# Patient Record
Sex: MALE | Race: WHITE | NOT HISPANIC OR LATINO | Employment: FULL TIME
[De-identification: names, ages, dates, MRNs, and addresses within clinical notes are randomized per-mention and may not be internally consistent; named-entity substitution may affect disease eponyms.]

---

## 2019-11-03 ENCOUNTER — HEALTH MAINTENANCE LETTER (OUTPATIENT)
Age: 29
End: 2019-11-03

## 2020-01-25 ENCOUNTER — APPOINTMENT (OUTPATIENT)
Dept: GENERAL RADIOLOGY | Facility: CLINIC | Age: 30
End: 2020-01-25
Attending: EMERGENCY MEDICINE
Payer: COMMERCIAL

## 2020-01-25 ENCOUNTER — HOSPITAL ENCOUNTER (EMERGENCY)
Facility: CLINIC | Age: 30
Discharge: HOME | End: 2020-01-25
Attending: EMERGENCY MEDICINE | Admitting: EMERGENCY MEDICINE
Payer: COMMERCIAL

## 2020-01-25 VITALS
SYSTOLIC BLOOD PRESSURE: 120 MMHG | HEIGHT: 70 IN | BODY MASS INDEX: 21.47 KG/M2 | DIASTOLIC BLOOD PRESSURE: 82 MMHG | TEMPERATURE: 98.7 F | WEIGHT: 150 LBS | OXYGEN SATURATION: 99 % | HEART RATE: 78 BPM | RESPIRATION RATE: 18 BRPM

## 2020-01-25 DIAGNOSIS — F10.10 ALCOHOL ABUSE: ICD-10-CM

## 2020-01-25 DIAGNOSIS — S22.060A COMPRESSION FRACTURE OF T8 VERTEBRA, INITIAL ENCOUNTER (H): ICD-10-CM

## 2020-01-25 PROCEDURE — 99285 EMERGENCY DEPT VISIT HI MDM: CPT | Mod: 25

## 2020-01-25 PROCEDURE — 96375 TX/PRO/DX INJ NEW DRUG ADDON: CPT

## 2020-01-25 PROCEDURE — 96374 THER/PROPH/DIAG INJ IV PUSH: CPT

## 2020-01-25 PROCEDURE — 25000128 H RX IP 250 OP 636: Performed by: EMERGENCY MEDICINE

## 2020-01-25 PROCEDURE — 72072 X-RAY EXAM THORAC SPINE 3VWS: CPT

## 2020-01-25 PROCEDURE — 25000132 ZZH RX MED GY IP 250 OP 250 PS 637: Performed by: EMERGENCY MEDICINE

## 2020-01-25 RX ORDER — CYCLOBENZAPRINE HCL 10 MG
10 TABLET ORAL 3 TIMES DAILY PRN
Qty: 20 TABLET | Refills: 0 | Status: SHIPPED | OUTPATIENT
Start: 2020-01-25 | End: 2021-04-27

## 2020-01-25 RX ORDER — KETOROLAC TROMETHAMINE 15 MG/ML
15 INJECTION, SOLUTION INTRAMUSCULAR; INTRAVENOUS ONCE
Status: COMPLETED | OUTPATIENT
Start: 2020-01-25 | End: 2020-01-25

## 2020-01-25 RX ORDER — ACETAMINOPHEN 500 MG
1000 TABLET ORAL ONCE
Status: COMPLETED | OUTPATIENT
Start: 2020-01-25 | End: 2020-01-25

## 2020-01-25 RX ORDER — IBUPROFEN 600 MG/1
600 TABLET, FILM COATED ORAL EVERY 6 HOURS PRN
Qty: 30 TABLET | Refills: 1 | Status: SHIPPED | OUTPATIENT
Start: 2020-01-25 | End: 2021-04-27

## 2020-01-25 RX ORDER — OXYCODONE HYDROCHLORIDE 5 MG/1
5 TABLET ORAL ONCE
Status: COMPLETED | OUTPATIENT
Start: 2020-01-25 | End: 2020-01-25

## 2020-01-25 RX ORDER — OXYCODONE HYDROCHLORIDE 5 MG/1
5 TABLET ORAL EVERY 6 HOURS PRN
Qty: 12 TABLET | Refills: 0 | Status: SHIPPED | OUTPATIENT
Start: 2020-01-25 | End: 2021-04-27

## 2020-01-25 RX ORDER — LORAZEPAM 2 MG/ML
1 INJECTION INTRAMUSCULAR ONCE
Status: COMPLETED | OUTPATIENT
Start: 2020-01-25 | End: 2020-01-25

## 2020-01-25 RX ADMIN — ACETAMINOPHEN 1000 MG: 500 TABLET, FILM COATED ORAL at 17:18

## 2020-01-25 RX ADMIN — LORAZEPAM 1 MG: 2 INJECTION INTRAMUSCULAR; INTRAVENOUS at 16:38

## 2020-01-25 RX ADMIN — OXYCODONE HYDROCHLORIDE 5 MG: 5 TABLET ORAL at 18:37

## 2020-01-25 RX ADMIN — KETOROLAC TROMETHAMINE 15 MG: 15 INJECTION, SOLUTION INTRAMUSCULAR; INTRAVENOUS at 16:38

## 2020-01-25 ASSESSMENT — MIFFLIN-ST. JEOR: SCORE: 1641.65

## 2020-01-25 ASSESSMENT — ENCOUNTER SYMPTOMS
NECK PAIN: 0
ABDOMINAL PAIN: 0
BACK PAIN: 1

## 2020-01-25 NOTE — ED NOTES
Bed: ED26  Expected date: 1/25/20  Expected time: 4:11 PM  Means of arrival: Ambulance  Comments:  445 29m fall, back pain ETA 1615

## 2020-01-25 NOTE — ED PROVIDER NOTES
"  History     Chief Complaint:    Back Pain    HPI   Uvaldo Morrow is a 29 year old male with a history of asthma and arrived via EMS who presents with back pain. The patient reports to have been walking downstairs with wet boots on and slipped, falling onto his back and, as reported via EMS, 5 stairs. He states that he waited 15 minutes before calling EMS and was able to stand initially on his own. He notes the pain to be \"maximal\" when moving and okay when still. The patient endorses that he had two beers before the fall. He denies any pain through his neck, hitting his head, chest pain, abdominal pain, urination, or tailbone pain.     Allergies:  No Known Drug Allergies       Medications:    Albuterol    Past Medical History:    Mild intermittent asthma  Gastroesophageal reflux disease     Past Surgical History:    Surgical history reviewed. No pertinent surgical history.    Family History:    Father: Hypertension     Social History:  The patient was accompanied to the ED by EMS with wife and daughter arriving after.  Smoking Status: Never Smoker  Smokeless Tobacco: Never Used  Alcohol Use: Positive. 1-2 drinks a day.  Drug Use: Negative  PCP: Marco Kirk  Marital Status:    Work: Contractor     Review of Systems   Cardiovascular: Negative for chest pain.   Gastrointestinal: Negative for abdominal pain.   Genitourinary: Negative for enuresis.   Musculoskeletal: Positive for back pain. Negative for neck pain.   All other systems reviewed and are negative.        Physical Exam   First Vitals:   Patient Vitals for the past 24 hrs:   BP Temp Temp src Pulse Resp SpO2 Height Weight   01/25/20 1840 120/82 -- -- 78 -- 99 % -- --   01/25/20 1640 (!) 132/96 98.7  F (37.1  C) Oral 92 18 99 % 1.778 m (5' 10\") 68 kg (150 lb)   01/25/20 1631 (!) 132/96 -- -- 93 18 100 % -- --       Physical Exam  Constitutional:  Oriented to person, place, and time. Appears uncomfortable with movement     Appears " well-developed and well-nourished. Smells of alcohol.  HENT:   Head:    Normocephalic and atraumatic.   Right Ear:   Tympanic membrane and external ear normal.   Left Ear:   Tympanic membrane and external ear normal.   Mouth/Throat:   Oropharynx is clear and moist.      Mucous membranes are normal.   Eyes:    Conjunctivae normal and EOM are normal.      Pupils are equal, round, and reactive to light.   Neck:    Normal range of motion. Neck supple.   Cardiovascular:  Normal rate, regular rhythm, S1 normal and S2 normal.      No gallop and no friction rub. No murmur heard.  Pulmonary/Chest:  Breath sounds normal. No respiratory distress.      No wheezes. No rhonchi. No rales.   Abdominal:   Soft. No hepatosplenomegaly. No tenderness.      No rebound and no CVA tenderness.   Musculoskeletal:  Normal range of motion.   Neurological:   Alert and oriented to person, place, and time. Normal strength.      GCS eye subscore is 4. GCS verbal subscore is 5.      GCS motor subscore is 6.   Skin:    Skin is warm and dry.   Psychiatric:   Normal mood and affect.      Speech is normal and behavior is normal.      Judgment and thought content normal.      Cognition and memory are normal.   Constitutional:  Oriented to person, place, and time.      Appears well-developed and well-nourished.   HENT:   Head:    Normocephalic and atraumatic.   Right Ear:   Tympanic membrane and external ear normal.   Left Ear:   Tympanic membrane and external ear normal.   Mouth/Throat:   Oropharynx is clear and moist and mucous membranes are normal.   Eyes:    Conjunctivae normal and EOM are normal.      Pupils are equal, round, and reactive to light.   Neck:    Normal range of motion. Neck supple.   Cardiovascular:  Normal rate, S1 normal, S2 normal and normal heart sounds.      No gallop. No murmur heard.  Pulmonary/Chest:  Effort normal and breath sounds normal.      No wheezes. No rhonchi. No rales.   Abdominal:   Soft. Normal appearance. No  hepatosplenomegaly.      No tenderness. No rigidity, no rebound, no guarding.      No CVA tenderness.   Musculoskeletal:  Normal range of motion. No cervical or midline back tenderness. Bilateral para thoracic spasm, left greater than right.     Neurological:   Alert and oriented to person, place, and time.      Normal strength and normal reflexes.      No cranial nerve deficit or sensory deficit.      GCS eye subscore is 4. GCS verbal subscore is 5.      GCS motor subscore is 6. Finger to nose intact bilaterally.    Emergency Department Course     Imaging:  Radiology findings were communicated with the patient who voiced understanding of the findings.    Thoracic spine XR, 3 views    Mild T8 superior endplate compression fracture.  BROOKS ROSS MD  Reading per radiology    Interventions:  1638 Ativan 1 mg IV  1638 Tordal 15 mg IV  1718 Tylenol 1000 mg PO  Oxycodone 5 mg po    Emergency Department Course:    1625 Nursing notes and vitals reviewed.    1630 I performed an exam of the patient as documented above.     1653 The patient was sent for a thoracic spine XR while in the emergency department, results above.     1707 Patient rechecked and updated.      1738 Patient rechecked and updated.      1838 Patient rechecked and updated.      1926 Findings and plan explained to the Patient. Patient discharged home with instructions regarding supportive care, medications, and reasons to return. The importance of close follow-up was reviewed. The patient was prescribed Flexeril, Advil, and Oxycodone.   His pain is gradually improving. He is ambulatory at time of discharge.       Impression & Plan     Medical Decision Making:  Patient is a 29-year-old male who slipped on 5 steps and complaining of mid back pain.  He denies hurting anything else in particular denies hitting his head.  He denies neck pain.  He has tenderness in his parathoracic area with spasm.  He actually did not have midline tenderness of any  significance.  T-spine x-ray showed T8 mild compression fracture.  He was acutely uncomfortable here and had received 1.5 of Dilaudid on route and received additional pain meds as noted above.  At time of discharge she was able to get up and walk although with discomfort.  He is neurologically intact.  I think he can be discharged home with conservative management to follow-up with orthospine this week.  At time of discharge wife did express concern about  alcohol abuse which the patient had denied to me.  We discussed then that he should limit his Tylenol and use the other medications and not to be drinking when he takes the oxycodone and the Flexeril.  He agreed to chemical dependency referral and this was ordered.  He will follow-up with his primary in the next week and sooner if worse.      Diagnosis:    ICD-10-CM    1. Compression fracture of T8 vertebra, initial encounter (H) S22.060A    2. Alcohol abuse F10.10 MENTAL HEALTH REFERRAL  - Adult; Assessments and Testing; Chemical Health Assessment; [facility]: CD Services [phone]; Patient call to schedule       Disposition:  The patient is discharged to home.    Discharge Medications:  New Prescriptions    CYCLOBENZAPRINE (FLEXERIL) 10 MG TABLET    Take 1 tablet (10 mg) by mouth 3 times daily as needed for muscle spasms    IBUPROFEN (ADVIL/MOTRIN) 600 MG TABLET    Take 1 tablet (600 mg) by mouth every 6 hours as needed for moderate pain    OXYCODONE (ROXICODONE) 5 MG TABLET    Take 1 tablet (5 mg) by mouth every 6 hours as needed for pain       Scribe Disclosure:  I, Valdemar Umanzor, am serving as a scribe at 4:30 PM on 1/25/2020 to document services personally performed by Mary Gil MD based on my observations and the provider's statements to me.         EMERGENCY DEPARTMENT       Mary Gil MD  01/26/20 [id number]

## 2020-01-25 NOTE — ED TRIAGE NOTES
Pt was walking down 5 steps when he slipped and fell, landing on tailbone and then sliding down the rest of the steps. C/o middle back spasms and pain. CMS intact.

## 2020-01-25 NOTE — ED AVS SNAPSHOT
Emergency Department  [address] [facility] [address]  Phone:  [phone]  Fax:  [phone]                                    Uvaldo Morrow   MRN: [id number]    Department:   Emergency Department   Date of Visit:  1/25/2020           After Visit Summary Signature Page    I have received my discharge instructions, and my questions have been answered. I have discussed any challenges I see with this plan with the nurse or doctor.    ..........................................................................................................................................  Patient/Patient Representative Signature      ..........................................................................................................................................  Patient Representative Print Name and Relationship to Patient    ..................................................               ................................................  Date                                   Time    ..........................................................................................................................................  Reviewed by Signature/Title    ...................................................              ..............................................  Date                                               Time          22EPIC Rev 08/18

## 2020-01-26 NOTE — DISCHARGE INSTRUCTIONS
You can use Tylenol 325 mg four times a day. I advise not drinking.   I have placed a chemical dependency referral. You need to call them.

## 2020-02-17 ENCOUNTER — TRANSFERRED RECORDS (OUTPATIENT)
Dept: HEALTH INFORMATION MANAGEMENT | Facility: CLINIC | Age: 30
End: 2020-02-17

## 2020-11-16 ENCOUNTER — HEALTH MAINTENANCE LETTER (OUTPATIENT)
Age: 30
End: 2020-11-16

## 2021-02-07 ENCOUNTER — HOSPITAL ENCOUNTER (EMERGENCY)
Facility: CLINIC | Age: 31
Discharge: HOME | End: 2021-02-07
Attending: PHYSICIAN ASSISTANT | Admitting: PHYSICIAN ASSISTANT
Payer: COMMERCIAL

## 2021-02-07 VITALS
DIASTOLIC BLOOD PRESSURE: 81 MMHG | BODY MASS INDEX: 19.26 KG/M2 | HEIGHT: 69 IN | OXYGEN SATURATION: 99 % | SYSTOLIC BLOOD PRESSURE: 115 MMHG | RESPIRATION RATE: 16 BRPM | WEIGHT: 130 LBS | HEART RATE: 73 BPM

## 2021-02-07 DIAGNOSIS — F48.9 MENTAL HEALTH PROBLEM: ICD-10-CM

## 2021-02-07 LAB
AMPHETAMINES UR QL SCN: POSITIVE
ANION GAP SERPL CALCULATED.3IONS-SCNC: 3 MMOL/L (ref 3–14)
BARBITURATES UR QL: NEGATIVE
BASOPHILS # BLD AUTO: 0.1 10E9/L (ref 0–0.2)
BASOPHILS NFR BLD AUTO: 0.7 %
BENZODIAZ UR QL: NEGATIVE
BUN SERPL-MCNC: 13 MG/DL (ref 7–30)
CALCIUM SERPL-MCNC: 9.1 MG/DL (ref 8.5–10.1)
CANNABINOIDS UR QL SCN: POSITIVE
CHLORIDE SERPL-SCNC: 107 MMOL/L (ref 94–109)
CO2 SERPL-SCNC: 31 MMOL/L (ref 20–32)
COCAINE UR QL: NEGATIVE
CREAT SERPL-MCNC: 0.8 MG/DL (ref 0.66–1.25)
DIFFERENTIAL METHOD BLD: ABNORMAL
EOSINOPHIL # BLD AUTO: 0 10E9/L (ref 0–0.7)
EOSINOPHIL NFR BLD AUTO: 0.6 %
ERYTHROCYTE [DISTWIDTH] IN BLOOD BY AUTOMATED COUNT: 12.7 % (ref 10–15)
ETHANOL SERPL-MCNC: <0.01 G/DL
GFR SERPL CREATININE-BSD FRML MDRD: >90 ML/MIN/{1.73_M2}
GLUCOSE SERPL-MCNC: 77 MG/DL (ref 70–99)
HCT VFR BLD AUTO: 38.5 % (ref 40–53)
HGB BLD-MCNC: 12.1 G/DL (ref 13.3–17.7)
IMM GRANULOCYTES # BLD: 0 10E9/L (ref 0–0.4)
IMM GRANULOCYTES NFR BLD: 0.3 %
LYMPHOCYTES # BLD AUTO: 1.8 10E9/L (ref 0.8–5.3)
LYMPHOCYTES NFR BLD AUTO: 24.5 %
MCH RBC QN AUTO: 26.9 PG (ref 26.5–33)
MCHC RBC AUTO-ENTMCNC: 31.4 G/DL (ref 31.5–36.5)
MCV RBC AUTO: 86 FL (ref 78–100)
MONOCYTES # BLD AUTO: 0.4 10E9/L (ref 0–1.3)
MONOCYTES NFR BLD AUTO: 5.3 %
NEUTROPHILS # BLD AUTO: 4.9 10E9/L (ref 1.6–8.3)
NEUTROPHILS NFR BLD AUTO: 68.6 %
NRBC # BLD AUTO: 0 10*3/UL
NRBC BLD AUTO-RTO: 0 /100
OPIATES UR QL SCN: NEGATIVE
PCP UR QL SCN: NEGATIVE
PLATELET # BLD AUTO: 279 10E9/L (ref 150–450)
POTASSIUM SERPL-SCNC: 3.7 MMOL/L (ref 3.4–5.3)
RBC # BLD AUTO: 4.5 10E12/L (ref 4.4–5.9)
SODIUM SERPL-SCNC: 141 MMOL/L (ref 133–144)
TSH SERPL DL<=0.005 MIU/L-ACNC: 0.78 MU/L (ref 0.4–4)
WBC # BLD AUTO: 7.1 10E9/L (ref 4–11)

## 2021-02-07 PROCEDURE — 84443 ASSAY THYROID STIM HORMONE: CPT | Performed by: PHYSICIAN ASSISTANT

## 2021-02-07 PROCEDURE — 99285 EMERGENCY DEPT VISIT HI MDM: CPT | Mod: 25

## 2021-02-07 PROCEDURE — 90791 PSYCH DIAGNOSTIC EVALUATION: CPT

## 2021-02-07 PROCEDURE — 85025 COMPLETE CBC W/AUTO DIFF WBC: CPT | Performed by: PHYSICIAN ASSISTANT

## 2021-02-07 PROCEDURE — 80307 DRUG TEST PRSMV CHEM ANLYZR: CPT | Performed by: PHYSICIAN ASSISTANT

## 2021-02-07 PROCEDURE — 80048 BASIC METABOLIC PNL TOTAL CA: CPT | Performed by: PHYSICIAN ASSISTANT

## 2021-02-07 PROCEDURE — 82077 ASSAY SPEC XCP UR&BREATH IA: CPT | Performed by: PHYSICIAN ASSISTANT

## 2021-02-07 ASSESSMENT — ENCOUNTER SYMPTOMS
COUGH: 0
ABDOMINAL PAIN: 0
FEVER: 0

## 2021-02-07 ASSESSMENT — MIFFLIN-ST. JEOR: SCORE: 1530.06

## 2021-02-07 NOTE — ED TRIAGE NOTES
Wife is with patient and wife is wanting patient to be evaluated for psych.  Using a lot of CBD oil, smoking CBD oil, and taking Delta 8.  Patient has been having grandious feelings, euphoria, and feels that he is extremely intelligent and a genius  all of a sudden.

## 2021-02-08 NOTE — DISCHARGE INSTRUCTIONS
Fortunately, your medical work-up is unremarkable.  You had an assessment by a mental health provider today and they thought an outpatient plan was reasonable.  I believe this is reasonable as well.  I believe you are safe to discharge home.  Please go to the therapy appointments scheduled for tomorrow at 1 PM and make use of the other resources that were provided.  Please return immediately if you feel your symptoms are worsening or you have any other concerns.  Also consider chemical dependence counseling/treatment.

## 2021-02-08 NOTE — ED PROVIDER NOTES
"  History   Chief Complaint:  Psychiatric Evaluation       HPI   Uvaldo Morrow is a 30 year old male with history of anxiety and depression who presents for a psychiatric evaluation. Per the patient's wife, his personality and behavior have been gradually changing over the past year, with a significant change in the past week. This week, she states that he has suddenly been behaving euphoric, making grandiose statements that he has finally figured out all of his problems, he has a special purpose in life, and that he can help everybody else in the world as well. Today, she reports that she heard him screaming while in the shower, and when she went into the bathroom she found that he was seemingly delusional, yelling that his parents had stolen from him by home schooling him and that he was going to kill them. The patient states that he does not recall this incident, though he has noticed a change in himself over the last several months. He states that he has finally realized why \"[he's] struggled so much [his] whole life\" and he feels as though he has finally figured his whole life out. He notes that he feels happy and excited about the future.  The patient's wife also notes that he has been going through phases of heavy alcohol use and is currently heavily using CBD oil and delta 8 THC, through both ingestion and smoking. Patient states he took delta 8 THC this morning, though otherwise denies co-ingestions or alcohol use recently. He otherwise denies chest pain, abdominal pain, cough, fever, and suicidal or homicidal ideation.    Review of Systems   Constitutional: Negative for fever.   Respiratory: Negative for cough.    Cardiovascular: Negative for chest pain.   Gastrointestinal: Negative for abdominal pain.   Psychiatric/Behavioral: Positive for behavioral problems. Negative for suicidal ideas.        Euphoria (+)   All other systems reviewed and are negative.    Allergies:  The patient has no known " "allergies.     Medications:  Adderall  Citalopram  Buspar    Past Medical History:    Asthma  GERD  PTSD  ADD  Anxiety  Depression      Past Surgical History:    [place] teeth extraction      Family History:    Psychiatric illness  Chromosomal disorder  Cancer     Social History:  Patient presents in the ED with his wife  Lives in a house.     Physical Exam     Patient Vitals for the past 24 hrs:   BP Pulse Resp SpO2 Height Weight   02/07/21 1926 115/81 73 16 99 % 1.753 m (5' 9\") 59 kg (130 lb)       Physical Exam  General: Resting comfortably.  Alert and oriented.   Head:  The scalp, face, and head appear normal   Eyes:  Conjunctivae and sclerae are normal    ENT:    The oropharynx is normal    Uvula is in the midline     Moist mucous membranes   Neck:  No lymphadenopathy  CV:  Regular rate and rhythm     Normal S1/S2  Resp:  Lungs are clear to auscultation    Non-labored    No rales or wheezing   GI:  Abdomen is soft, non-distended    No abdominal tenderness   MS:  Normal muscular tone     Moving all four extremities    Ambulatory   Skin:  No rash or acute skin lesions noted   Neuro: Speech is normal and fluent    Moving all four extremities    Strength and sensation intact in all four extremities    No focal deficits  Psych:  Occasionally good eye contact.  Normal thought pattern.  Intermittent grandiose type thoughts and ideas.  Fluent speech.  Alert and oriented to self, place and time.  Well kempt in appearance.    Emergency Department Course   Laboratory:  CBC: HGB 12.1 (L), WNL (WBC 7.1, )     BMP: AWNL (Creatinine 0.80)    (Collected 1831) Alcohol Ethyl: <0.1    Drug Abuse Screen 77 Urine: Amphetamines positive (A), Cannabinoids positive (A), o/w negative.    TSH with Free T4: 0.78    Emergency Department Course:  Reviewed:  I reviewed the patient's nursing notes, vitals, past medical records, Care Everywhere.     Assessments:  1810 I performed an exam of the patient and obtained history, as " documented above.     2306 I rechecked the patient and discussed the DEC assessment with him and his wife. We discussed plan of care moving forward and I addressed any concerns and questions the two of them had. They are feeling comfortable with discharge.    Consults:   2241 I consulted with the DEC  regarding their evaluation of the patient. At this time, they are recommending outpatient treatment.    Disposition:  The patient was discharged to home.     Impression & Plan   Medical Decision Making:  Uvaldo Morrow is a 30 year old male who presents for a psychiatric evaluation. Please refer the HPI for full details. Essentially, the wife has noted a personality change that she cannot quite describe over the last year or so. However this has become more apparent, especially over the last couple of days. She has noted that he has started to have grandiose ideas about his intelligence and other somewhat paranoid thinking/thought patterns. Today, while in the shower she heard a loud screaming noise and went to the restroom and he stated that he suddenly felt like he understood and stated that his parents are the problem and he made a passive comment about killing his parents. This is what prompted the wife to call the police and they subsequently brought him here for evaluation. On exam, the patient has completely normal exam. He has normal neurologic exam. There has been no head injury recently. I do not feel CT imaging is indicated. Basic labs here are reassuring. Thyroid is normal. Urine drug screen demonstrates cannabinoid and amphetamine use, though otherwise negative.  Alcohol level is undetectable. Patient does demonstrate some behaviors in the room of some grandiose thinking, but overall has relatively normal affect. He has good eye contact and is well kempt in appearance. He has a normal thought process and answers questions appropriately. He does not have pressured speech. However, given the  homicidal threat today, I feel it was DEC evaluation was warranted. DEC spent over an hour with the patient and his wife discussing his symptoms and plan going forward. The DEC  thought it was reasonable for outpatient management. She has a therapy appointment scheduled for tomorrow and several other resources in regards to mental health and chemical dependency resources. I feel this is reasonable. Patient and wife are on board. They understand that if the symptoms worsen or they want him re-evaluated they can come back at any time. I do not feel the patient presents a threat to himself or others. Red flag symptoms, and reasons to return discussed and understood.  They will follow up with therapy and other resources as per DEC's note.  All questions were answered prior to discharge.  The patient/wife understand and agree to this plan.    Diagnosis:    ICD-10-CM    1. Mental health problem  F48.9        Scribe Disclosure:  I, Chiquita Nicole, am serving as a scribe at 6:21 PM on 2/7/2021 to document services personally performed by Sunshine Sanches PA-C based on my observations and the provider's statements to me.     February 7, 2021   [facility] Emergency Department     Sunshine Sanches PA-C  02/08/21 0053

## 2021-04-27 ENCOUNTER — HOSPITAL ENCOUNTER (OUTPATIENT)
Facility: CLINIC | Age: 31
Setting detail: OBSERVATION
Discharge: HOME | End: 2021-04-28
Attending: EMERGENCY MEDICINE | Admitting: PSYCHIATRY & NEUROLOGY
Payer: COMMERCIAL

## 2021-04-27 DIAGNOSIS — F32.A DEPRESSION, UNSPECIFIED DEPRESSION TYPE: ICD-10-CM

## 2021-04-27 DIAGNOSIS — F41.1 GAD (GENERALIZED ANXIETY DISORDER): ICD-10-CM

## 2021-04-27 DIAGNOSIS — F31.81 BIPOLAR 2 DISORDER (H): ICD-10-CM

## 2021-04-27 PROBLEM — Z86.59 HISTORY OF PSYCHIATRIC DISORDER: Status: ACTIVE | Noted: 2021-04-27

## 2021-04-27 PROBLEM — F41.9 ANXIETY: Status: ACTIVE | Noted: 2021-04-27

## 2021-04-27 LAB
ANION GAP SERPL CALCULATED.3IONS-SCNC: 2 MMOL/L (ref 3–14)
BUN SERPL-MCNC: 8 MG/DL (ref 7–30)
CALCIUM SERPL-MCNC: 8.9 MG/DL (ref 8.5–10.1)
CHLORIDE SERPL-SCNC: 104 MMOL/L (ref 94–109)
CO2 SERPL-SCNC: 32 MMOL/L (ref 20–32)
CREAT SERPL-MCNC: 0.85 MG/DL (ref 0.66–1.25)
GFR SERPL CREATININE-BSD FRML MDRD: >90 ML/MIN/{1.73_M2}
GLUCOSE SERPL-MCNC: 79 MG/DL (ref 70–99)
LABORATORY COMMENT REPORT: NORMAL
POTASSIUM SERPL-SCNC: 3.7 MMOL/L (ref 3.4–5.3)
SARS-COV-2 RNA RESP QL NAA+PROBE: NEGATIVE
SODIUM SERPL-SCNC: 138 MMOL/L (ref 133–144)
SPECIMEN SOURCE: NORMAL

## 2021-04-27 PROCEDURE — 250N000013 HC RX MED GY IP 250 OP 250 PS 637: Performed by: PSYCHIATRY & NEUROLOGY

## 2021-04-27 PROCEDURE — 80048 BASIC METABOLIC PNL TOTAL CA: CPT | Performed by: EMERGENCY MEDICINE

## 2021-04-27 PROCEDURE — G0378 HOSPITAL OBSERVATION PER HR: HCPCS

## 2021-04-27 PROCEDURE — 99220 PR INITIAL OBSERVATION CARE,LEVEL III: CPT | Performed by: PSYCHIATRY & NEUROLOGY

## 2021-04-27 PROCEDURE — 80175 DRUG SCREEN QUAN LAMOTRIGINE: CPT | Performed by: EMERGENCY MEDICINE

## 2021-04-27 PROCEDURE — 87635 SARS-COV-2 COVID-19 AMP PRB: CPT | Performed by: EMERGENCY MEDICINE

## 2021-04-27 PROCEDURE — 90791 PSYCH DIAGNOSTIC EVALUATION: CPT

## 2021-04-27 PROCEDURE — C9803 HOPD COVID-19 SPEC COLLECT: HCPCS

## 2021-04-27 PROCEDURE — 99285 EMERGENCY DEPT VISIT HI MDM: CPT | Mod: 25

## 2021-04-27 RX ORDER — ARIPIPRAZOLE 2 MG/1
2 TABLET ORAL ONCE
Status: COMPLETED | OUTPATIENT
Start: 2021-04-27 | End: 2021-04-27

## 2021-04-27 RX ORDER — DEXTROAMPHETAMINE SACCHARATE, AMPHETAMINE ASPARTATE, DEXTROAMPHETAMINE SULFATE AND AMPHETAMINE SULFATE 7.5; 7.5; 7.5; 7.5 MG/1; MG/1; MG/1; MG/1
20 TABLET ORAL 2 TIMES DAILY
COMMUNITY
End: 2024-03-27

## 2021-04-27 RX ORDER — HYDROXYZINE HYDROCHLORIDE 50 MG/1
50 TABLET, FILM COATED ORAL EVERY 4 HOURS PRN
Status: DISCONTINUED | OUTPATIENT
Start: 2021-04-27 | End: 2021-04-28 | Stop reason: HOSPADM

## 2021-04-27 RX ORDER — LAMOTRIGINE 100 MG/1
100 TABLET ORAL DAILY
Status: DISCONTINUED | OUTPATIENT
Start: 2021-04-28 | End: 2021-04-28 | Stop reason: HOSPADM

## 2021-04-27 RX ORDER — LAMOTRIGINE 100 MG/1
100 TABLET ORAL DAILY
COMMUNITY

## 2021-04-27 RX ADMIN — ARIPIPRAZOLE 2 MG: 2 TABLET ORAL at 22:24

## 2021-04-27 RX ADMIN — HYDROXYZINE HYDROCHLORIDE 50 MG: 50 TABLET, FILM COATED ORAL at 23:26

## 2021-04-27 NOTE — ED PROVIDER NOTES
"  History   Chief Complaint:  Mental Health Problem    HPI  History supplemented by electronic chart review and wife at bedside    Uvaldo Morrow is a 30 year old male with history of asthma and prior diagnosis of PTSD, \"bipolar\" and depression who presents with a mental health problem. The patient reports that recently he has been having a difficult time managing his emotions and mood. He notes that he has been having \"high-highs and low-lows\" sporadically over the past few weeks, but worse over the past 3 days. He states that he is having waves of anxiety and self-regulating his emotions. He notes having one episode of anxiety while in the waiting room where he was feeling very anxious, but did not have a specific source of anxiety. The patient does have a psychiatrist, Dr. Sawyer and called the clinic today with his symptoms. He was told to present to the emergency department. The patient denies any changes in his sleep and states he gets 8-9 hours a night. He denies any thoughts of self-harm or homicidal ideation. He also denies any vomiting, diarrhea or COVID-19 exposure. Of note, the patient's wife states he took a synthetic THC a week ago and that he was not supposed to be taking this with his Lamictal.  No hallucinations.  No history of thyroid problems or other serious medical conditions.    Review of Systems   All other systems reviewed and are negative.    Allergies:  The patient has no known allergies.     Medications:  Albuterol  Flexeril  Roxicodone  Adderall  Lamictal     Past Medical History:    Allergic rhinitis  Intermittent asthma  GERD  PTSD  ADD  Depression     Past Surgical History:    [place] teeth extraction    Family History:    Father: hypertension, prostate cancer, psychiatric illness  Sister: psychiatric illness  Brother: psychiatric illness    Social History:  The patient presents with his wife.   The patient works in sales contracts.     Physical Exam     Patient Vitals for the past 24 " "hrs:   BP Temp Temp src Pulse Resp SpO2 Weight   04/27/21 1817 122/75 97.9  F (36.6  C) Temporal 86 18 100 % 59 kg (130 lb)       Physical Exam  General: Nontoxic-appearing male sitting upright in room 15, wife at bedside  HENT: mucous membranes moist, thyroid nonpalpable  Eyes: PERRL without nystagmus  CV: extremities well perfused, regular rhythm  Resp: normal effort, speaks in full phrases, no stridor, no cough observed  GI: abdomen soft and nontender, no guarding  MSK: no bony tenderness   Skin: appropriately warm and dry  Neuro: alert, clear speech, oriented, normal tone in extremities, ambulatory  Psych: reports feeling \"anxious\", cooperative, denies feeling suicidal, no evidence of hallucinations, no apparent silvia, able to articulate recent symptoms fairly well      Emergency Department Course     Laboratory:  BMP: Anion Gap 2 (L) o/w WNL (Creatinine 0.85)   Lamotrigine Level: Pending    Asymptomatic COVID19 Virus PCR by nasopharyngeal swab: Negative     Emergency Department Course:    Reviewed:  I reviewed nursing notes, vitals, past medical history and care everywhere    Assessments:  1832 I obtained history and examined the patient as noted above.   2015 I rechecked the patient and explained findings.     Disposition:  The patient was transferred to [facility].       Impression & Plan   Medical Decision Making:  Medical precipitants of his symptoms were considered but thought to be extremely unlikely in light of his prior history of similar symptoms.  Electrolytes are reassuring.  Lamictal level was sent though this is a send out and will not result today, but I think it is most [facility].  He is voluntary and does not meet criteria to be held against his will.  His wife was involved and agrees with this plan as well.    Covid-19  Uvaldo Morrow was evaluated during a global COVID-19 pandemic, which necessitated " consideration that the patient might be at risk for infection with the SARS-CoV-2 virus that causes COVID-19.   Applicable protocols for evaluation were followed during the patient's care.   COVID-19 was considered as part of the patient's evaluation. The plan for testing is:  a test was obtained during this visit.    Diagnosis:    ICD-10-CM    1. Anxiety  F41.9    2. Depression, unspecified depression type  F32.9    3. History of psychiatric disorder  Z86.59          Scribe Disclosure:  I, Elijah Whyte, am serving as a scribe at 6:21 PM on 4/27/2021 to document services personally performed by Jayme Dumont MD based on my observations and the provider's statements to me.     This note was completed in part using Dragon voice recognition software. Although reviewed after completion, some word and grammatical errors may occur.          Jayme Dumont MD  04/27/21 2148

## 2021-04-27 NOTE — ED TRIAGE NOTES
"Pt reports he has had worsening bipolar voer the last 3 days describes as \"high highs and low lows\".    "

## 2021-04-28 VITALS
TEMPERATURE: 97.7 F | HEIGHT: 69 IN | HEART RATE: 97 BPM | OXYGEN SATURATION: 99 % | SYSTOLIC BLOOD PRESSURE: 120 MMHG | WEIGHT: 130 LBS | BODY MASS INDEX: 19.26 KG/M2 | RESPIRATION RATE: 16 BRPM | DIASTOLIC BLOOD PRESSURE: 82 MMHG

## 2021-04-28 PROCEDURE — 250N000013 HC RX MED GY IP 250 OP 250 PS 637: Performed by: PSYCHIATRY & NEUROLOGY

## 2021-04-28 PROCEDURE — G0378 HOSPITAL OBSERVATION PER HR: HCPCS

## 2021-04-28 PROCEDURE — 99217 PR OBSERVATION CARE DISCHARGE: CPT | Mod: GC | Performed by: PSYCHIATRY & NEUROLOGY

## 2021-04-28 RX ORDER — ARIPIPRAZOLE 5 MG/1
5 TABLET ORAL DAILY
Qty: 30 TABLET | Refills: 0 | Status: SHIPPED | OUTPATIENT
Start: 2021-04-28 | End: 2024-03-27

## 2021-04-28 RX ADMIN — LAMOTRIGINE 100 MG: 100 TABLET ORAL at 07:36

## 2021-04-28 ASSESSMENT — ACTIVITIES OF DAILY LIVING (ADL)
DRESS: STREET CLOTHES
ORAL_HYGIENE: INDEPENDENT
HYGIENE/GROOMING: INDEPENDENT

## 2021-04-28 NOTE — ED PROVIDER NOTES
"ED Observation Psychiatric Discharge Summary   [facility] Emergency Department - [facility] Unit  Discharge Date: 4/28/2021    Uvaldo Morrow MRN: [id number]   Age: 30 year old YOB: 1990     Brief HPI & Initial ED Course     Chief Complaint   Patient presents with     Mental Health Problem     Pt reports he has had worsening bipolar voer the last 3 days describes as \"high highs and low lows\".       HPI  Uvaldo Morrow is a 30 year old male with a past history notable for asthma, PTSD and bipolar 2 admitted to the [facility] Observation Unit with worsening up and down moods and anxiety.    The patient was evaluated in the emergency department by a physician and licensed mental health . The patient's psychiatric state was such that he would benefit from ongoing monitoring. Observation care was initiated with the plan including serial assessments of psychiatric condition, potential administration of medications if indicated, and further disposition pending the patient's psychiatric course during the monitoring period.     See ED Observation H&P for further details on the patient's presenting history and initial evaluation.     On examination today, the patient reports that his mood has improved and he is tolerating the addition of Abilify well.  He was able to tolerate the discussion of stressors which precipitated his emergency room visit.  He expressed feeling hopeful and was looking forward to his weekly therapy sessions.  He will continue medication management appointments however will inquire for sooner appointments as he was previously being seen every 3 months.  He reflected on his diagnosis and reported acceptance after appreciating the mood instability he has been experiencing.  He asked appropriate questions regarding his medication as we discussed potential side effects to monitor for over the upcoming weeks and months.    He denied suicidal and homicidal thoughts.  No psychotic symptoms " "reported.  No medication side effects reported.    Physical Examination   BP: 120/82  Pulse: 97  Temp: 97.7  F (36.5  C)  Resp: 16  Height: 175.3 cm (5' 9\")  Weight: 59 kg (130 lb)  SpO2: 99 %    Physical Exam  General: Appears stated age.   Neuro: Alert and fully oriented. Extremities appear to demonstrate normal strength on visual inspection.   Integumentary/Skin: no rash visualized, normal color    Psychiatric Examination   Appearance: awake, alert  Attitude:  cooperative  Eye Contact:  good  Mood:  better  Affect:  mood congruent  Speech:  clear, coherent  Psychomotor Behavior:  no evidence of tardive dyskinesia, dystonia, or tics  Throught Process:  logical  Associations:  no loose associations  Thought Content:  no evidence of suicidal ideation or homicidal ideation and no evidence of psychotic thought  Insight:  fair  Judgement:  intact  Oriented to:  time, person, and place  Attention Span and Concentration:  intact  Recent and Remote Memory:  fair    Results        Labs Ordered and Resulted from Time of ED Arrival Up to the Time of Departure from the ED   BASIC METABOLIC PANEL - Abnormal; Notable for the following components:       Result Value    Anion Gap 2 (*)     All other components within normal limits   SARS-COV-2 (COVID-19) VIRUS RT-PCR   LAMOTRIGINE LEVEL   PATIENT CARE ORDER       Observation Course   The patient was found to have a psychiatric condition that would benefit from an observation stay in the emergency department for further psychiatric stabilization and/or coordination of a safe disposition. The plan upon observation admission included serial assessments of psychiatric condition, potential administration of medications if indicated, further disposition pending the patient's psychiatric course during the monitoring period.     Serial assessments of the patient's psychiatric condition were performed. Nursing notes were reviewed. During the observation period, the patient did not require " medications for agitation, and did not require restraints/seclusion for patient and/or provider safety.     After a period of working with the treatment team on the [facility] unit, the patient's mental state improved to allow a safe transition to outpatient care. After counseling on the diagnosis, work-up, and treatment plan, the patient was discharged. Close follow-up with a psychiatrist and/or therapist was recommended and community psychiatric resources were provided. Patient is to return to the ED if any urgent or potentially life-threatening concerns.     Medication adjustment/changes that were addressed during this visit included:  -Addition of Abilify 2 mg which was increased to 5 mg daily at the time of discharge targeting mood stabilization as lamotrigine is gradually being titrated up while awaiting a therapeutic response to that medication.  Once he is reached and anticipated therapeutic dose of 200 mg with lamotrigine, he may discuss whether there is benefit of continuing treatment with Abilify as an augmenting agent or if it would be more beneficial to utilize as monotherapy.  Risks and benefits of Abilify were reviewed along with the importance of monitoring appetite and weight gain over the next weeks to months.    He will continue weekly individual therapy appointments.  He will contact his medication prescriber to request more frequent visits until his medication plan is solidified and he is noted to be having a therapeutic response.     At the time of discharge, the patient's acute suicide risk was determined to be low due to the following factors: Reduction in the intensity of mood/anxiety symptoms that preceded the admission, denial of suicidal thoughts, denies feeling helpless or helpless, not currently under the influence of alcohol or illicit substances, denies experiencing command hallucinations, no immediate access to firearms. The patient's acute risk could be higher if noncompliant with their  treatment plan, medications, follow-up appointments or using illicit substances or alcohol. Protective factors include: social supports, children, stable housing, employment.    Discharge Diagnoses:   Final diagnoses:   Depression, unspecified depression type   Bipolar 2 disorder (H) - rule out   SARA (generalized anxiety disorder)     Disposition: Discharged home  --  Elliott Delgadillo MD  [facility] EMERGENCY DEPT  [facility] Unit  4/28/2021        Elliott Delgadillo MD  04/28/21 0986

## 2021-04-28 NOTE — PLAN OF CARE
Problem: Mood Impairment (Anxiety Signs/Symptoms)  Goal: Improved Mood Symptoms (Anxiety Signs/Symptoms)  Outcome: Improving     Pt felt that he was safe to discharge and had no follow up questions after providing education related to new medication that was prescribed-abilify

## 2021-04-28 NOTE — ED NOTES
Awake, watching TV.  Reports feeling more calm now that he is away from his home environment.  Hydroxyzine given to patient as he is worried about being able to sleep tonight.

## 2021-04-28 NOTE — ED NOTES
Affect was blunted, pt reports that his mood is calm. Patient agrees to discharge plan. Discharge instructions reviewed with patient including follow-up care plan. Medication abilify was ordered and sent to pt's community pharmacy. Pt denied any questions regarding this new medication. Reviewed safety plan and outpatient resources. Denies SI and HI. All belongings that were brought into the hospital have been returned to patient. Escorted off the unit at 1019 accompanied by [facility] staff Discharged to home via uber ride.

## 2021-04-28 NOTE — ED PROVIDER NOTES
"ED Observation Psychiatric [facility] Emergency Department - [facility] Unit  Observation Initiation Date: Apr 27, 2021    Uvaldo Morrow MRN: [id number]   Age: 30 year old YOB: 1990     History     Chief Complaint   Patient presents with     Mental Health Problem     Pt reports he has had worsening bipolar voer the last 3 days describes as \"high highs and low lows\".       HPI  Uvaldo Morrow is a 30 year old male with a past history notable for asthma, PTSD and bipolar 2 [facility] with worsening up and down moods and anxiety.  He feels his thoughts are racing with his anger and irritability becoming more labile than normal. He has had times where he feels very sad. He has no suicidal thoughts although he is scared that they may come due to the lack of control of his mood. He is under much stress including work stress and marital issues. He feels he cannot do anything good and has some helplessness. He just got up to 100 mg of lamictal a week ago. He denies any alcohol or drug use.     Past Medical History  Past Medical History:   Diagnosis Date     Allergic rhinitis, cause unspecified      Mild intermittent asthma     exercise or allergy related.     Past Surgical History:   Procedure Laterality Date     NO HISTORY OF SURGERY       amphetamine-dextroamphetamine (ADDERALL) 30 MG tablet  lamoTRIgine (LAMICTAL) 100 MG tablet  albuterol (PROAIR HFA, PROVENTIL HFA, VENTOLIN HFA) 108 (90 BASE) MCG/ACT inhaler      Allergies   Allergen Reactions     No Known Drug Allergies      Family History  Family History   Problem Relation Age of Onset     Diabetes Paternal Grandfather      Cerebrovascular Disease Paternal Grandfather      Heart Disease Paternal Grandfather      Prostate Cancer Paternal Grandfather      Cancer Maternal Grandfather         throat     Hypertension Father      Cancer - colorectal No family hx of      Social History   Social History     Tobacco Use     " "Smoking status: Never Smoker     Smokeless tobacco: Never Used   Substance Use Topics     Alcohol use: Yes     Comment: socially     Drug use: Yes     Comment: Past=julien      Past medical history, past surgical history, medications, allergies, family history, and social history were reviewed with the patient. No additional pertinent items.       Review of Systems  A complete review of systems was performed with pertinent positives and negatives noted in the HPI, and all other systems negative.    Physical Examination   BP: 122/75  Pulse: 86  Temp: 97.9  F (36.6  C)  Resp: 18  Height: 175.3 cm (5' 9\")  Weight: 59 kg (130 lb)  SpO2: 100 %    Physical Exam  General: Appears stated age.   Neuro: Alert and fully oriented. Extremities appear to demonstrate normal strength on visual inspection.   Integumentary/Skin: no rash visualized, normal color    Psychiatric Examination   Appearance: awake, alert  Attitude:  cooperative  Eye Contact:  good  Mood:  anxious and sad   Affect:  appropriate and in normal range  Speech:  clear, coherent  Psychomotor Behavior:  no evidence of tardive dyskinesia, dystonia, or tics  Throught Process:  logical, linear and goal oriented  Associations:  no loose associations  Thought Content:  no evidence of suicidal ideation or homicidal ideation and no evidence of psychotic thought  Insight:  good  Judgement:  intact  Oriented to:  time, person, and place  Attention Span and Concentration:  intact  Recent and Remote Memory:  intact    ED Course        Labs Ordered and Resulted from Time of ED Arrival Up to the Time of Departure from the ED   BASIC METABOLIC PANEL - Abnormal; Notable for the following components:       Result Value    Anion Gap 2 (*)     All other components within normal limits   SARS-COV-2 (COVID-19) VIRUS RT-PCR   LAMOTRIGINE LEVEL   PATIENT CARE ORDER       Assessments & Plan (with Medical Decision Making)   Patient presenting with . Nursing notes reviewed noting no " acute issues.     I have reviewed the assessment completed by the [facility].     He was given a dose of 2 mg of abilify to start to help with mood stabilization.  He will also be given 50 mg of hydroxyzine 50 mg for sleep with the ability to repeat it if needed.  He has therapy and psychiatry.  Most likely he will need to increase his lamictal and continue the abilify, but this plan can be worked on tomorrow when discharged.      The patient was found to have a psychiatric condition that would benefit from an observation stay in the emergency department for further psychiatric stabilization and/or coordination of a safe disposition. The observation plan includes serial assessments of psychiatric condition, potential administration of medications if indicated, further disposition pending the patient's psychiatric course during the monitoring period.     Preliminary diagnosis:    ICD-10-CM    1. Anxiety  F41.9    2. Depression, unspecified depression type  F32.9    3. Bipolar 2 disorder (H)  F31.81     rule out        --  Seun Baugh MD   [facility] EMERGENCY DEPT  [facility] Unit  4/27/2021          Seun Baugh MD  04/27/21 1460

## 2021-04-28 NOTE — CONSULTS
4/27/2021  Uvaldo ESCAMILLA Cristhian 1990     [facility] Mental Health Assessment:    Started at: 2110  Completed at: 2251  What type of assessment are you doing today? Full DA    1.  Presenting Problem:      Referral Method to ED? Self and Family/Friends     What brings the patient to the ED today? Patient came into the ED accompanied by his wife.  He reports having difficulty controlling and managing his emotions and mood over the past few weeks.  Patient describes his feelings as an emotional whiplash with mood cycles becoming more frequent and intense.  Wife shares that that patient denies having a Bipolar diagnosis or mental health issues however patient was showing some acceptance during my interview of mental health concerns.  Patient denies having suicidal thoughts currently yet was receptive education about passive suicidal thoughts with normalization of that occurrence.      Has this happened before? Yes previous psych hospitalization 2/2021    Duration of presenting problem: several weeks but increasing intensity and frequency of mood shifts starting 4/24/2021    Additional Stressors: marriage conflicts, work stresses (new manager), strives for perfection    2.  Risk Assessment:  Suicide and Self-Harm    ESS-6  1.a. Over the past 2 weeks, have you had thoughts of killing yourself? No   1.b. Have you ever attempted to kill yourself and, if yes, when did this last happen? No  2. Recent or current suicide plan? No  3. Recent or current intent to act on ideation? No  4. Lifetime psychiatric hospitalization? No  5. Pattern of excessive substance use? No  6. Current irritability, agitation, or aggression? Yes  ESS-6 Score: 1    SI: Passive  Plan: No  Intent: No   Prior Attempts: No     Protective Factors: daughter, family, work, wants to know why these moods shifts are happening    Hopes and goals for the future: be a good father, improve relationship with wife, understand mental health better    Coping Skills: What helps  and doesn't help? Talk with providers, play with daughter, take medications     Additional Risk Factors Related to Safety and Suicide: No    Is the patient engaged in self injurious behaviors? No     Risk to Others    Aggressive/Assaultive/Homicidal Risk Factors: No     Duty to Warn? No     Was a Child Protection Report Made? No       Was a Adult Protection Report Made? No        Sexually inappropriate behavior? No        Vulnerability to sexual exploitation? No     Additional information: n/a      3. Mental Health Symptoms and Substance Use  Current Symptoms and Mental Health History    GAIN Short Screener (GAIN-SS) administered? NA    Attention, Hyperactivity, and Impulsivity Symptoms      Patient reported symptoms related to hyperactivity, inattention, or impulsivity? Yes: Restless  Patient seemed restless in terms of trying to figure out what his going on in his mind, trying to find balance in life, identify boundaries, learn how to cope with not having all the answers right in the moment.      Anxiety Symptoms    Patient reported anxiety symptoms? Yes: Generalized Symptoms: Cognitive anxiety - feelings of doom, racing thoughts, difficulty concentrating  and Excessive worry     Patient endorses anxiety related to not knowing the cause of his current mental health situation, worries that he has alienated his wife and family, anxious about not having control of his mood swings.  He shared that he will have frequent life epiphanies where he will have significant paradigm shifts which are complex.  Patient fears that he has lost everything in life during this time of mood swings and instability.      Behavioral Difficulties    Patient reported behavioral difficulties? No   Patient does not appear to have any behavioral difficulties listed her.     Mood Symptoms    Patient reported mood disorder symptoms? Yes: Crying or feels like crying, Feelings of hopelessness , Impaired decision making  and Sad, depressed mood     Patient was tearful throughout the assessment, especially when talking about his daughter and wanting to have answers for most situations.  He stated he feels hopeless most of the time due to causing harm and difficulties to his family, especially his wife and their relationship.  Patient appeared sad and depressed throughout the assessment.     Eating Disorders and Appetite Disturbance      Patient reported appetite symptoms? No       SCOFF  Do you make yourself sick (induce vomiting) because you feel uncomfortably full? n/a   Do you worry that you have lost Control over how much you eat? n/a  Have you recently lost more than 14 lb in a three-month period? n/a   Do you think you are too fat, even though others say you are too thin? n/a   Would you say that food dominates your life? n/a  SCOFF Score: n/a    Patient reported appetite or eating disorder symptoms? No      Interpersonal Functioning     Patient reported difficulties that may be associated with personality and interpersonal functioning? Yes: Impaired Interpersonal Functioning  Patient endorses that he and his wife's relationship is very strained and he is surprised she has not left him.  However, I pointed out that they have seemed to work through previous difficulties as she is here with him tonight, supporting as she can and wanting him to get help.      Learning Disabilities/Cognitive/Developmental Disorders    Patient reported concerns related to learning disabilities, cognitive challenges, and/or developmental disorders? No       General Cognitive Impairments    Patient reported symptoms of cognitive impairments? No  If yes, complete Mini-Cog Assessment.    Sleep Disturbance    Patient reported difficulties with sleep? No   Patient reports getting good, quality, restful sleep at night.  He tends to sleep from 10/11pm to 7am.      Psychosis Symptoms    Patient reported symptoms of psychosis? No    Patient denies having any AH, VH, or delusional  thoughts.     Trauma and Post-Traumatic Stress Disorder    Physical Abuse: No   Emotional/Psychological Abuse: No  Sexual Abuse: No  Loss of a friend or family member to suicide: No  Other Traumatic Event: No     Patient reported trauma related symptoms? No   Patient denied experiencing any trauma listed here.     Impact of Mental Health on Functioning      Negative Impact Score: 8/10   Subjective Impact on functioning: Patient shares that this situation/condition has impacted his life a lot recently and he does not know why he is experiencing the rapid cycling of mood and thoughts.  He wants to have answers as to why he is experiencing this.   How do symptoms vary from baseline? Patient reports he used to be able to manage/control the mood shifts better as they happened less frequently or were intense.     Current and Historical Substance Use Note:    IIs there a history of, or current, substance use? Yes: history of alcohol use, sober for one year; history of THC use, sober for three months.     Have you been to chemical dependency treatment or detox before? No     CAGE-AID    Have you felt you ought to cut down on your drinking or drug use? No     Have people annoyed you by criticizing your drinking or drug use? No   Have you felt bad or guilty about your drinking or drug use? No  Have you ever had a drink or used drugs first thing in the morning to steady your nerves or to get rid of a hangover? No   CAGE-AID Score: 0/4    Drug screen completed? No   BAL/Breathalyzer completed? No       Mental Status Exam:    Affect: Labile  Appearance: Appropriate   Attention Span/Concentration: Attentive    Eye Contact: Variable  Fund of Knowledge: Appropriate   Language /Speech Content: Expressive Speech  Language /Speech Volume: Soft   Language /Speech Rate/Productions: Slow   Recent Memory: Intact  Remote Memory: Intact  Mood: Depressed and Sad   Orientation:   Person: Yes   Place: Yes  Time of Day: Yes   Date: Yes    Situation (Do they understand why they are here?): Yes   Psychomotor Behavior: Normal   Thought Content: Clear  Thought Form: Goal Directed and Intact    4. Social and Environmental Conditions   Is the patient their own guardian? 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 No    Spiritual and Cultural Influences  Do you have any Mormonism beliefs that are important in your life? No     Do you have any cultural influences in your life that impact your mental health care? No        5. Psychiatric History, Medical History, and Current Care      Patient Mental Health Services   Does the patient have a history of mental health concerns/diagnoses? Yes diagnosed with Bipolar from community psychiatry     Current Providers  Primary Care Provider: Yes [facility]. Marco Kirk, [facility]   [facility]   Therapist: Yes Christo Parikh [facility], [address] [place] [address] [address], [place], [place]; [phone]   : No   ARMHS: No   ACT Team: No   Other: No    History of Commitment? No  History of Psychiatric Hospitalizations? No one prior ED visit for similar concern here at [facility] in Feb. 2021  History of programmatic care? No    Family Mental Health History   Family History of Mental Health or Chemical Dependency Issues? No     Development and Physical Health Challenges  Delays or concerns meeting developmental milestones? No  Current psychotropic medications? Yes Lamictal   Medication Compliant? Yes   Recent medication changes? Yes  Community psychiatrist increased Lamictal dose to 100mg  on 4/21/2021  History of concussion or TBI? No     Additional Information: patient is concerned about current medication change causing these frequent mood swings.     6. Collateral Information and Collaboration    Collaboration with medical staff:Referral Information:   Medical Records, Psychiatry and Nursing     Collateral Information/Sources: Medical Records: [facility] records and Family: wife ([phone])    7. Assessment and Diagnosis  Assessment of patient strengths and vulnerabilities    Strengths, Protective Factors, & Community Resources: engaged with providers, wants answers, relationship with his daughter, wants to do good by others    Patient skills, abilities, and coping skills (what is going well?): good father, willing to be vulnerable with providers, serves others    Patient vulnerabilities: strives for perfection, does not always respect or acknowledge boundaries, fears he lost everything    Diagnosis  F32.9 Major depresive disorder, single episode, unspecified   R/O F31.31 Bipolar disorder, current episode depressed, mild      8.Therapeutic Methodologies Utilized in Assessment    Psychotherapy techniques and/or interventions used: Establishing rapport, Active listening, Apply solution-focused therapy to address current crisis, Identify additional supports and alternative coping skills and Brief Supportive Therapy    9. Patient Care/Treatment Plan  Summary of Patient Presentation and needs  What are the basic needs for this patient in this moment? Observation overnight, medication management, psychoeducation about Bipolar and medications.       Consultations :  Attending provider consulted? Yes  Attending Name: Amauri   Attending concurs with disposition? Yes     Recommended disposition: Individual Therapy, Medication Management and Other: observation on Em[facility] overnight     Does the patient agree with the recommended level of care? Yes    Final disposition: Other: reassess patient in the morning after  getting an increase of Lamictal; continue with individual therapy and community psyciatry    Disposition Details: patient will be reassessed in the morning by doc and LMHP to see how he feels with an increase dose of Lamictal and a night of rest. He will continue with community provider for therapy and psychiatry.     If Inpatient, is patient admitted voluntary? Yes   Patient aware of potential for transfer if there is not appropriate placement? NA  Patient is willing to travel outside of the [facility] for placement? NA   Central Intake Notified? No    10. Patient Care Document: Safety and After Care Planning:          Safety Plan Provided? No    Follow-Up Plans and Providers: reassess in the morning with doctor and LMHP    Follow-Up Plan:  After care plan provided to the patient/guardian by: no  After care plan provided to any additional sources/parties? No    Duration of face to face time with patient in minutes: .50 hrs    CPT code(s) utilized: 40353 - Psychotherapy for Crisis - 60 (30-74*) min      ALIN Carrasquillo

## 2021-04-28 NOTE — DISCHARGE INSTRUCTIONS
Make a follow up appointment with Dr. Sawyer for psychiatry follow up  Continue attending weekly therapy       If I am feeling unsafe or I am in a crisis, I will:   Contact my established care providers   Call the National Suicide Prevention Lifeline: [phone]   Go to the nearest emergency room   Call [phone]        Warning signs that I or other people might notice when a crisis is developing for me: wanting to talk about things immediately, mood swings    Things I am able to do on my own to cope or help me feel better: playing video games     Things that I am able to do with others to cope or help me better: spending time with daughter     Things I can use or do for distraction: video games, go for a walk, watch TV     People in my life that I can ask for help: parents, wife at times     Your county has a mental health crisis team you can call 24/7: [facility] Crisis Line Number: [phone]    Other things that are important when I m in crisis: continue taking medication as prescribed, attending therapy, follow up with psychiatry

## 2021-04-28 NOTE — PLAN OF CARE
"30 year old male received from ER due to increased anxiety and labile mood. Reports feeling \"blakely with high high's and low low's recently.\" Denies SI/HI. Previous MH history includes depression and PTSD. Patient presents with flat affect, anxious in mood. Patient search completed with two staff members present. Nursing assessment complete including patient and medication profiles. Risk assessments completed addressing suicide and safety issues. Care plan initiated. Video monitoring in progress.     Estefanía Caban RN      "

## 2021-04-28 NOTE — PROGRESS NOTES
"[facility] Reassessment Note:    Writer met with patient this morning for check in and reassessment. He states he did not sleep well last night due to being in a recliner, \"I just couldn't get comfortable\". Patient spends time processing events that led to his emPATH visit, reporting that he has been having \"marriage and home issues\" with his wife. He endorses feeling as though he has been having \"dramatic mood swings\" over the past week, however yesterday he was shifting between moods \"every hour\". He called his psychiatrist's office and was recommended to be seen in the ED. Patient was recently switched to Lamictal with his provider however feels this has been making his symptoms worse. Patient feels ready to discharge after meeting with psychiatry. Safety plan completed.     He has an appointment with his therapist next Monday at noon and sees her weekly.  He will schedule an appointment with his psychiatrist, Dr. Sawyer within the next few weeks.     Pricilla Castellanos on 4/28/2021 at 8:00 AM    "

## 2021-04-28 NOTE — PHARMACY-ADMISSION MEDICATION HISTORY
Pharmacy Medication History  Admission medication history interview status for the 4/27/2021  admission is complete. See EPIC admission navigator for prior to admission medications     Location of Interview: Patient room  Medication history sources: Patient      Medication reconciliation completed by provider prior to medication history? No    Time spent in this activity: 15 minutes     Prior to Admission medications    Medication Sig Last Dose Taking? Auth Provider   amphetamine-dextroamphetamine (ADDERALL) 30 MG tablet Take 30 mg by mouth 2 times daily 4/27/2021 at Unknown time Yes Reported, Patient   lamoTRIgine (LAMICTAL) 100 MG tablet Take 100 mg by mouth daily 4/27/2021 at Unknown time Yes Reported, Patient   albuterol (PROAIR HFA, PROVENTIL HFA, VENTOLIN HFA) 108 (90 BASE) MCG/ACT inhaler Inhale 2 puffs into the lungs every 6 hours as needed for shortness of breath / dyspnea. Unknown at Unknown time  Priyank Grimaldo MD       The information provided in this note is only as accurate as the sources available at the time of update(s) '

## 2021-04-30 LAB — LAMOTRIGINE SERPL-MCNC: 4.4 UG/ML (ref 2.5–15)

## 2021-09-18 ENCOUNTER — HEALTH MAINTENANCE LETTER (OUTPATIENT)
Age: 31
End: 2021-09-18

## 2022-01-08 ENCOUNTER — HEALTH MAINTENANCE LETTER (OUTPATIENT)
Age: 32
End: 2022-01-08

## 2022-11-19 ENCOUNTER — HEALTH MAINTENANCE LETTER (OUTPATIENT)
Age: 32
End: 2022-11-19

## 2023-04-15 ENCOUNTER — HEALTH MAINTENANCE LETTER (OUTPATIENT)
Age: 33
End: 2023-04-15

## 2024-03-27 ENCOUNTER — OFFICE VISIT (OUTPATIENT)
Dept: FAMILY MEDICINE | Facility: CLINIC | Age: 34
End: 2024-03-27
Payer: COMMERCIAL

## 2024-03-27 VITALS
WEIGHT: 143.8 LBS | OXYGEN SATURATION: 98 % | TEMPERATURE: 98.1 F | HEART RATE: 108 BPM | SYSTOLIC BLOOD PRESSURE: 128 MMHG | HEIGHT: 69 IN | DIASTOLIC BLOOD PRESSURE: 88 MMHG | RESPIRATION RATE: 18 BRPM | BODY MASS INDEX: 21.3 KG/M2

## 2024-03-27 DIAGNOSIS — F66 GENDER IDENTITY UNCERTAINTY IN ADULT: Primary | ICD-10-CM

## 2024-03-27 PROCEDURE — 99204 OFFICE O/P NEW MOD 45 MIN: CPT | Performed by: NURSE PRACTITIONER

## 2024-03-27 RX ORDER — DEXTROAMPHETAMINE SACCHARATE, AMPHETAMINE ASPARTATE, DEXTROAMPHETAMINE SULFATE AND AMPHETAMINE SULFATE 5; 5; 5; 5 MG/1; MG/1; MG/1; MG/1
20 TABLET ORAL
COMMUNITY
Start: 2024-03-05

## 2024-03-27 RX ORDER — DEXTROAMPHETAMINE SACCHARATE, AMPHETAMINE ASPARTATE MONOHYDRATE, DEXTROAMPHETAMINE SULFATE AND AMPHETAMINE SULFATE 5; 5; 5; 5 MG/1; MG/1; MG/1; MG/1
20 CAPSULE, EXTENDED RELEASE ORAL
COMMUNITY
Start: 2024-03-11

## 2024-03-27 ASSESSMENT — ASTHMA QUESTIONNAIRES
ACT_TOTALSCORE: 25
QUESTION_5 LAST FOUR WEEKS HOW WOULD YOU RATE YOUR ASTHMA CONTROL: COMPLETELY CONTROLLED
QUESTION_4 LAST FOUR WEEKS HOW OFTEN HAVE YOU USED YOUR RESCUE INHALER OR NEBULIZER MEDICATION (SUCH AS ALBUTEROL): NOT AT ALL
QUESTION_1 LAST FOUR WEEKS HOW MUCH OF THE TIME DID YOUR ASTHMA KEEP YOU FROM GETTING AS MUCH DONE AT WORK, SCHOOL OR AT HOME: NONE OF THE TIME
ACT_TOTALSCORE: 25
QUESTION_2 LAST FOUR WEEKS HOW OFTEN HAVE YOU HAD SHORTNESS OF BREATH: NOT AT ALL
QUESTION_3 LAST FOUR WEEKS HOW OFTEN DID YOUR ASTHMA SYMPTOMS (WHEEZING, COUGHING, SHORTNESS OF BREATH, CHEST TIGHTNESS OR PAIN) WAKE YOU UP AT NIGHT OR EARLIER THAN USUAL IN THE MORNING: NOT AT ALL

## 2024-03-27 ASSESSMENT — PATIENT HEALTH QUESTIONNAIRE - PHQ9
SUM OF ALL RESPONSES TO PHQ QUESTIONS 1-9: 7
SUM OF ALL RESPONSES TO PHQ QUESTIONS 1-9: 7
10. IF YOU CHECKED OFF ANY PROBLEMS, HOW DIFFICULT HAVE THESE PROBLEMS MADE IT FOR YOU TO DO YOUR WORK, TAKE CARE OF THINGS AT HOME, OR GET ALONG WITH OTHER PEOPLE: SOMEWHAT DIFFICULT

## 2024-03-27 NOTE — PATIENT INSTRUCTIONS
Gender dysphoria Bible is a good place to start in regards of finding vocabulary and understanding gender dysphoria and the difference between different dysphoria as that surround gender and different context.  Sharyn, I would start here:  https://genderdysphoria.fyi/en      Crisis, Support, Resources for Sexual Orientation and Gender Diverse Humans    [facility] is an organization dedicated to LGBT care and equality in the [facility]. They have a repository of different resources available here:  https://www.[facility].org/find-community    The [place] Project:  A non-judgmental hotline for those 25 years old and below with LGBTQ-sensitive trained counselors you can contact through a call, text, or chat during a mental health crisis and/or suicidal thoughts.  [place] Lifeline call: 1-613.535.1949  TrevorText: Text the word START to 510991  TrevorChat: Enter the online portal on The Catalino Project s website: https://www.theRoundarchvorproject.org/get-help-now/#tt    Trans Lifeline:  Primarily for transgender people in a crisis, from struggling with gender identity to thoughts of self-harm, available 24/7.  [place]: 7-330-301-1713  [place]: 0-096-123-5648    RHINA:  Older LGBT people who want to talk can be connected with friendly responders who are ready to listen.     24/7 Toll Free Hotline: 416-624-BFMP 5428)    GLBT National Help Center:  The GLBT National Help Center provides telephone, online chat, and email peer-support. They speak with callers of all ages about bullying, workplace issues, HIV/AIDS anxiety, coming out, relationships, safer sex, and more. They also have a massive resource database for social and support groups, ogden-friendly Caodaism organizations, sports, leagues, student groups, and more.    Contact information:    a) Toll-Free National Hotline      1-433.918.8255      Monday - Friday 4 p.m. to 12 a.m. ET      Saturday 12 p.m. to 5 p.m. ET    b) Youth Talkline      1-749.448.7929       Monday - Friday 4 p.m. to 12 a.m. ET      Saturday 12 p.m. to 5 p.m. ET      For teens and young adults up to age 25    Crisis Text Line:  Crisis Text Line serves anyone, in any type of crisis, providing access to free, 24/7 support via a medium people already use and trust: text.    Text HOME to: 703189    Support Groups   [facility]  A community discussion group by and for bi/pan/fluid/queer/unlabeled and allied communities  ([phone], ext. [phone]  Email Johnathon Hummel at [email or web address] for more information    Coming Out/Lesbian Support - [place] or [place]  [phone] to register and request     [facility]  LGBTQQIA specific counseling, support groups, and presentations  ([phone]    [facility]  LGBTQ specific counseling, support groups and presentations  ([phone]    GLBT in Recovery [facility]  List of support groups and resources for GLBT people in recovery  Email for more information at [email or web address] [email or web address]    GLBT National Help [facility]  Peer counseling, information and local resources for LGBTQ callers  GLBT National Hotline: (127) 463-3618  GLBT National Youth Talkline: (655) 967-9288    [facility] [place]  A safe space for LGBTQ/Two-Spirit youth to be themselves in a bully-free zone, achieve self-confidence, and be proud of who they are  ([phone]    [place] LGBTA [facility]  LGBTQA therapists and support group information    [place] Transgender Health Coalition [facility]  Focusing upon Trans health issues in the local community  [phone]    National [place] on Mental Illness (MARYANNE) [facility]  Provides LGBTQ support groups and counseling  [facility] GLBTQ Support Group: (198) [phone]    [facility]  Provides counseling services and support groups for LGBT-identified youth  ([phone]    So What if  I Am? - [place]  Open support group for LGBTQ youth  [phone]    Trans & Non-Binary Elder Social Club  A monthly, virtual social event for trans and non-binary elders in the [place] area  https://[employer].org/event/trans-non-binary-elder-social-club/2021-12-03/    Trans* Folks, Friends & Allies - [place]   A monthly gathering for people of all genders; a safe space to gather, network, share our resources and experiences, discuss local and national events in trans* activist communities, and celebrate all our fabulous gender identities and expressions  [phone], ext. [phone]  Email Johnathon Hummel at [email or web address] for more information    [facility]  Support groups for men including coming out, bisexual men, and ogden dads  ([phone]      Therapist or Organization Children Adolescents Adults Family Support Groups Other   [facility]   (multiple providers)  [address]  [place] [address]  [phone]  [facility].Acupera YES YES YES YES  Relationship Counseling   [facility]  (Multiple providers and locations)  [phone]  [email or web address] YES YES YES      [facility] in Human Sexuality  (multiple providers)  [address]  [place] [address]  [phone]   sexualhealth.[facility] YES YES YES YES  Epic order ([facility])   [facility]- LGBT Residential CD Treatment Program  [address] [place] [address]  [phone]  [email or web address]   YES   Chemical Dependence Treatment   Paulina Jalloh PsyD, LP  [address]  [place]  [address]  [phone]   YES YES     [facility]  (multiple providers)  [address] [facility] [place] [facility], [address]  [email or web address]  [email or web address]  YES YES  YES    [facility] (multiple providers and locations)  [address] [place] [address]  [email or web address]  YES YES  YES     [facility]   (multiple providers and locations)  [email or web address] YES YES YES  YES DBT, Psych Testing   [facility]  (multiple providers)  [place], [place]  [phone]  choicespsychotherapy.[email or web address] YES YES YES YES  DBT   [facility]  (multiple providers)  [phone]  Therapy-[email or web address]  YES YES   Canadian language available   [facility]  (multiple providers)  [address] [place] [address] [phone] (English/Canadian)  [phone] (Hmong)  www.familypartnership.org YES YES YES   Canadian and Hmong language available   [facility]  (multiple providers)  [address] [place], [place] [address] [phone]  [employer] YES YES YES      Perlita Sauceda Ph.D.  [email or web address]   YES      [facility]  (multiple providers)  [address]  [place] [address]   Phone: [phone]  Playerize YES YES YES YES  In home services available   Lion Portillo, Ph.D.   [phone]  Soonr   YES      Calli Price Psy.RUTH.,   [phone]   YES      [facility], [place], [place]  [email or web address] YES YES YES      Divina Valentino, [facility]  [address]  [place] [address]  DionnOntodia  [phone]   YES YES  On Busline  Couples too   Nela Gardner PsyD, [facility]  [facility]  [address]  [place], [place]   [phone] YES YES YES YES  Business consulting   Kris Mai, PhD, LP  [address]  [place], [place] [id number] [phone]  Babs.Sequenta  Older Adolescents YES   On busline   Kulwant Potter, [facility]  [address]  [place] [address]  [phone]  Renae.Sequenta  YES YES YES  Busline   Paulina Alanis M.Ed, [facility], [facility]  [address]  [place]   [phone]  [email or web address]   YES YES   Experienced in Trans Veterans    Patricia Cortes MA, LMFT, CST  [address] [place] [address]  "[id number]  [phone]  [employer]  YES YES YES  Sex positive therapy, relationship counseling   [facility]  [address] [place] [address]  [phone]  [facility].care YES YES  YES     [facility]  (Multiple providers and locations)  [phone]  [email or web address]  YES YES      [facility]  (Multiple providers and locations)  [phone]   [facility] YES YES YES YES     [employer]  [address] [place] [address]  [phone]  [email or web address]  YES YES   Chemical Dependence   [facility] [employer] [facility], [facility]  [employer]  [address] [facility] [address]  [place] [address]  [phone]  [employer]   YES      G Shiv Morel MFLISETH, PsyD, LP   [address]  [place] [address]  [phone]  [employer]   YES YES  Sexuality groups  busline     Additional Provider Directories  [place]'s lesbian ogden bisexual transgender & allied mental health providers' network  http://www.lgbttherapists.org/    Directory for LGBT related resources in [place]  https://www.outfront.org/support-counseling-organizations    Directory for kink, Polyamory, gender non-conforming aware Providers  http://www.kinkaware.org           Name__________________   and     MRN___________________   or   Label Here        Informed Consent   Feminizing Medications      This form refers to the use of estrogen and/or androgen antagonists (sometimes called \"anti-androgens\" or \"androgen blockers\") by persons in the male-to-female spectrum who wish to become feminized to reduce gender dysphoria and facilitate a more feminine gender presentation. While there are risks associated with taking feminizing medications, when appropriately prescribed they can greatly improve mental health and quality of life.     Please seek another opinion if you want additional perspective on any aspect of your care. " "      Feminizing Effects     1. I understand that estrogen, androgen antagonists, or a combination of the two may be  prescribed to reduce male physical features and feminize my body.     2. I understand that the feminizing effects of estrogen and androgen antagonists can take several  months to a year to become noticeable, speed and degree of change is unpredictable.     3.  I understand that if I am taking estrogen I will probably develop breasts, and:        Breasts may take several years to develop to their full size.   Even if estrogen is stopped, the breast tissue that has developed will remain.   As soon as breasts start growing, it is recommended to have an annual breast exam.  There may be milky nipple discharge (galactorrhea). If you develop this, it is advised to check with a doctor to determine the cause.   It is not known if taking estrogen increases the risk of breast cancer.     4. I understand that the following changes are generally not permanent (that is, they will likely reverse if I stop taking feminizing medications):     Skin may become softer.   Muscle mass decreases and there may be a decrease in upper body strength.   Body hair growth may become less noticeable and grow more slowly,but won't stop.  Male pattern baldness may slow down, but will probably not stop completely, and hair that has already been lost will likely not grow back.   Fat may redistribute to a more feminine pattern (decreased in abdomen, increased on buttocks/hips/thighs - changing from \"apple shape\" to \"pear shape\").       5. I understand that taking feminizing medications will make my testicles produce less testosterone, which can affect my overall sexual function:    Fertility may be impaired, and I should consider sperm banking if I desire biological children.  Sperm may not mature, leading to reduced fertility. It is still possible to get someone pregnant however and contraception should be used if needed.  Testicles " "may shrink by 25-50%. Testicular examinations are still recommended.  The amount of fluid ejaculated may be reduced.   There is typically decrease in morning and spontaneous erections.   Erections may not be firm enough for penetrative sex.   Libido (sex drive) may decrease.     6. I understand that there are some aspects of my body that are not significantly changed by feminizing medications, though there may be other treatments that can be helpful.    Jenkins/facial hair may grow more slowly and be less noticeable, but will not go away.   Voice pitch will not rise and speech patterns will not become more feminine.   The laryngeal prominence (\"Jose R's apple\") will not shrink.      Risks of Feminizing Medications     7. I understand that the medical effects and safety of feminizing medications are not fully understood, and that there may be long-term risks that are not yet known.     8. I understand that I am strongly advised not to take more medication than I am prescribed, as this increases health risks. It won't help changes come faster.     9. I understand that feminizing medications can damage the liver. I have been advised that I should be monitored for possible liver damage as long as I am taking feminizing medications.     10. I understand that feminizing medications will result in changes that will be noticeable by other people, and that some people in similar circumstances have experienced harassment, discrimination, and violence, while others have lost support of loved ones. I have been advised that referrals can be made for support/counselling if this would be helpful.     Medical Risks Associated with Estrogen     11. I understand that taking estrogen increases the risk of blood clots, which can result in:     Pulmonary embolism (blood clot to the lungs), which may cause death.  Stroke, which may cause permanent brain damage or death   Heart attack   Chronic leg vein problems     I understand that the risk " of blood clots is much worse if I smoke cigarettes, especially over age 40. I understand that the danger is so high that I should stop smoking completely if I start taking estrogen. I can ask my doctor for advice on quitting.    12. I understand that taking estrogen can increase deposits of fat around my internal organs, which is associated with increased risk for diabetes and heart disease.     13. I understand that taking estrogen can cause increased blood pressure,  estrogen increases the risk of gallstones,  estrogen can cause headaches or migraines and can cause nausea and vomiting. I have been advised that if I develop these, it is recommended that I discuss this with my doctor.     14. I understand that it is not known if taking estrogen increases the risk of non-cancerous tumors of the pituitary gland. I have been informed that although this is typically not life-threatening, it can damage vision. I understand that this will be monitored.    15. I have been informed that I am more likely to have dangerous side effects from estrogen if I smoke, am overweight, am over 40 years old, or have a history of blood clots, high blood pressure, or a family history of breast cancer.     16. I have been informed that if I take too much estrogen, my body may convert it into testosterone, which may slow or stop feminization.     Medical Risks Associated with Androgen Antagonists     17. I have been informed that spironolactone affects the balance of water and salts in the kidneys, and that this may:     Increase the amount of urine produced, and I may urinate more frequently   Reduce blood pressure   Rarely, cause high levels of potassium in the blood, and this will be monitored    18. I understand that some androgen antagonists make it more difficult to evaluate the results of PSA test. If I am over 50, I should have my prostate evaluated every year.     Prevention of Medical Complications     19. I agree to take  feminizing medications as prescribed and to tell my care provider if I am not happy with the treatment or am experiencing any problems.     20. I understand that the right dose or type of medication prescribed for me may not be the same as for someone else.     21. I understand that physical examinations and blood tests are needed on a regular basis (monthly, at first) to check for negative side effects of feminizing medications.     22. I understand that feminization medications can interact with other medication (including other sources of hormones), dietary supplements, herbs, alcohol, and street drugs. I understand that being honest with my care provider about what else I am taking will help prevent medical complications that could be life-threatening. I have been informed that I will continue to get medical care no matter what information I share.     23. I understand that some medical conditions make it dangerous to take estrogen or androgen antagonists. I agree to be checked for risky conditions before the decision to start or continue feminizing medication is made.     24. I understand that I can choose to stop taking feminizing medication at any time, and that it is advised that I do this with the help of my doctor to make sure there are no negative reactions to stopping. I understand that my doctor may suggest I reduce, switch or stop taking feminizing medication, if there are severe health risks that can't be controlled.    My signature below confirms that:     My doctor has talked with me about the benefits and risks of feminizing medication, the possible or likely consequences of hormone therapy, and potential treatment options.   I understand the risks that may be involved.   I understand that this form covers known effects and risks and that there may be long-term effects or risks that are not yet known.   I have had sufficient opportunity to discuss treatment options with my doctor. All of my  questions have been answered to my satisfaction.   I believe I have adequate knowledge on which to base informed consent to the provision of feminizing medication.     Based on this:     _____ I wish to begin taking estrogen.     _____ I wish to begin taking androgen antagonists (e.g., Spironolactone).     _____ I do not wish to begin taking feminizing medication at this time.     Whatever your current decision is, please talk with your doctor any time you have questions, concerns, or want to re-evaluate your options.         ____________________________________ __________________   Patient Signature     Date         ____________________________________ __________________   Prescribing Clinician Signature    Date    Some online resources for transgender health    [place] Transgender Health Coalition   Home to the [facility] at [address] [place] [address], [phone]. Also has support groups.  http://www.[place]transhealth.org/    [facility] Transgender Health  Increasing access to comprehensive, effective, and affirming healthcare services for trans and gender-variant communities.  http://www.transhealth.[facility].edu/trans?page=ravi-00-05    [facility]   Community-based non-profit committed to advancing the health & wellness of LGBTQ communities through research, education and advocacy. http://www.PharmaDiagnosticshealth.org    [facility] Glossary of Transgender Terms  http://www.Mygeniwayhealth.org/site/DocServer/Handout_7-C_Glossary_of_Gender_and_Transgender_Terms__fi.pdf?tieLH=1539    Safe, gender-neutral public restrooms in the [facility]   http://www.mntranshealth.org//index.php?option=com_content&task=view&id=12&Itemid    Trans Youth Support Network  For people 26 and under who identify as a trans or gender non-conforming person and want to be a part of an activist organization. Offers peer support, education and community building opportunities.    http://www.transyouthsupportnetwork.org/    [facility]:  [facility] offers mental and integrative health services for LGBTQ youth and their families.  http://www.reclaim-lgbtyouth.org/    Gender Spectrum, for Trans Youth  Gender Spectrum provides education, training and support to help create a gender sensitive and inclusive environment for all children and teens. http://www.genderspectrum.org/    Andrew fermin FTM Resource Guide  Information on topics of interest to female-to-male (FTM, F2M) trans men, and their friends and loved ones.  http://ftmguide.org/    Also check out your local [facility] pickrseter resource center!  LGBTQIA Services at [facility]: http://www.[facility]/lgbtqia/  LGBTQ@[facility] at [facility]: http://www.[facility]/multiculturallife/lgbtq/  GLBTA Programs office at [facility]: https://diversity.[facility]/glbta/    Thoughts of self harm?   Trans Lifeline can be reached at [phone].   This service is staffed by trans people 24/7.   For LGBT youth (ages 24 and younger) contemplating suicide, the [place] Project Lifeline can be reached at 2-204-2209.       Effects and Expected Time Course of Feminizing Hormones    Effect Expected Onset Expected Maximum Effect   Body Fat redistribution 3-6 months 2-5 years   Decreased Muscle mass 3-6 months 1-2 years   Softening of skin/decreased oiliness 3-6 months Unknown   Decreased Libido 1-3 months 1-2 years   Decreased Spontaneous Erections 1-3 months 3-6 months   Male Sexual Dysfunction Variable Variable   Breast Growth 3-6 months 2-3 years   Decreased Testicular volume 3-6 months 2-3 years   Decreased sperm production Variable Variable   Thinning and slowed growth of body and facial hair  + 6-12 months >3 years   Male pattern baldness No regrowth, loss stops 1-3 months 1-2 years   +complete removal of male facial hair and body hair requires electrolysis, laser treatment or both

## 2024-03-27 NOTE — PROGRESS NOTES
Assessment & Plan     Gender identity uncertainty in adult  This is a 33-year-old assigned male at birth patient with past medical history of asthma, allergies, GERD, depression, anxiety, bipolar, ADHD  Today is their first time discussing what is thought to be longstanding gender dysphoria with a healthcare provider.  Today they present outwardly as female for the first time with use of body shaping garments of the hips and chest as well as a fem wig and experiencing gender euphoria.     Support was provided as far as next steps.  I highly recommended gender specific therapy.  At this time hormones and surgical options were briefly discussed and expectations that they can set for each.  At this time patient will continue to have ongoing discussions with family as well as discussions with his psychiatrist.    Resources were given for local gender therapist as well as support groups and LGBTQ organizations that they can get reliable information from.    Moving forward they prefer to use he/him pronouns and medical documentation and keep given name in medical documentation though they will continue to explore use of alternative pronouns and preferred names.  I will follow up with them in 4-8 weeks.    Plan:  Continue open and honest communication  Reach out to LGBTQ support groups for you and possibly family   Seek gender health specific mental health therapist  Consider marriage therapy after this  Continue with psychiatry for medications    Patient presents today for gender care only.  They will continue to see the primary care provider through [facility].  All physical mental social health and family medical history were reviewed today.      Spent 40mins doing chart review, history and exam, patient education, documentation and further activities per the note.                    Yady Bailon is a 33 year old, presenting for the following health issues:  Follow Up (Gender care)        3/27/2024     1:23 PM  "  Additional Questions   Roomed by BOY-LPN   Accompanied by NONE     HPI   Here by themselves today.  They were referred here by their psychiatrist      Preferred name: a-Sharyn (Robb/sharyn?) - but would like given name in record for now  Pronouns: They/Them? But would like he/his in med record for now      MH hx:  Psychiatry - [facility].   ADD, bipolar,   Abilify no longer on - was on for 1 week  Lamictal 100mg.   Adderall 20XR in Am and 20mg IR in afternoon      Not currently seeing a therapist.    Social:  Has 8yo daughter - developmental delay - disabled (genetic conditions)  And 4 month old   for 12 years to cis female    Gender Hx:   Aug 2023 - Started season of \"more self acceptance\" experimenting   Wife was pregnant   Started experimented more with themselves and allowed him to feel more repressed feelings  \"Primal feeling of gender confusion\" at age of 4   Leaning toward scenario when having both attributes - male and female    Fantasized about treatments and surgery since being a young kid- fantasies were transitory. Desire to be female will come and then be out of their mind the next moment.    Grew up dad as  and conservative up bringing - wasn't an option to explore  Feels like they think that expression other genders were beautiful    Concerned about them feeling back and forth, hard to know what to plan for.    Has questions about hormones, surgery, and support    Support:   Sister and brothers were accepting  Came out to wife: period in 2021, bought women's clothing and purged what they had. And August \"went full throttle.\"    Wife wanted to come today but couldn't due to children's appointment.   Patient prefers to be here by themselves                        Review of Systems  Constitutional, HEENT, cardiovascular, pulmonary, gi and gu systems are negative, except as otherwise noted.      Objective    /88 (BP Location: Right arm, Patient Position: Sitting, Cuff Size: Adult " "Regular)   Pulse 108   Temp 98.1  F (36.7  C) (Oral)   Resp 18   Ht 1.753 m (5' 9\")   Wt 65.2 kg (143 lb 12.8 oz)   SpO2 98%   BMI 21.24 kg/m    Body mass index is 21.24 kg/m .  Physical Exam   GENERAL: alert and no distress, feminine appearing wig and shape wear in place.   RESP: easy work of breathing  CV: well profused   PSYCH: mentation appears normal, affect normal/bright            Signed Electronically by: KEV Chowdary CNP    Answers submitted by the patient for this visit:  Patient Health Questionnaire (Submitted on 3/27/2024)  If you checked off any problems, how difficult have these problems made it for you to do your work, take care of things at home, or get along with other people?: Somewhat difficult  PHQ9 TOTAL SCORE: 7    "

## 2024-06-16 ENCOUNTER — HEALTH MAINTENANCE LETTER (OUTPATIENT)
Age: 34
End: 2024-06-16

## 2025-06-21 ENCOUNTER — HEALTH MAINTENANCE LETTER (OUTPATIENT)
Age: 35
End: 2025-06-21